# Patient Record
Sex: MALE | Race: OTHER | Employment: UNEMPLOYED | URBAN - METROPOLITAN AREA
[De-identification: names, ages, dates, MRNs, and addresses within clinical notes are randomized per-mention and may not be internally consistent; named-entity substitution may affect disease eponyms.]

---

## 2018-06-11 ENCOUNTER — HOSPITAL ENCOUNTER (OUTPATIENT)
Dept: GENERAL RADIOLOGY | Age: 43
Discharge: HOME | End: 2018-06-11
Attending: FAMILY MEDICINE
Payer: MEDICAID

## 2018-06-11 ENCOUNTER — LAB ENCOUNTER (OUTPATIENT)
Dept: LAB | Age: 43
End: 2018-06-11
Attending: FAMILY MEDICINE
Payer: MEDICAID

## 2018-06-11 ENCOUNTER — OFFICE VISIT (OUTPATIENT)
Dept: FAMILY MEDICINE CLINIC | Facility: CLINIC | Age: 43
End: 2018-06-11

## 2018-06-11 VITALS
SYSTOLIC BLOOD PRESSURE: 121 MMHG | WEIGHT: 183 LBS | TEMPERATURE: 98 F | DIASTOLIC BLOOD PRESSURE: 72 MMHG | HEIGHT: 72 IN | HEART RATE: 63 BPM | BODY MASS INDEX: 24.79 KG/M2

## 2018-06-11 DIAGNOSIS — F10.20 ALCOHOLISM (HCC): ICD-10-CM

## 2018-06-11 DIAGNOSIS — K92.1 BLOOD IN STOOL: ICD-10-CM

## 2018-06-11 DIAGNOSIS — R10.84 GENERALIZED ABDOMINAL PAIN: ICD-10-CM

## 2018-06-11 DIAGNOSIS — R10.84 GENERALIZED ABDOMINAL PAIN: Primary | ICD-10-CM

## 2018-06-11 DIAGNOSIS — F12.90 MARIJUANA USE: ICD-10-CM

## 2018-06-11 DIAGNOSIS — F17.200 SMOKER: ICD-10-CM

## 2018-06-11 PROCEDURE — 81001 URINALYSIS AUTO W/SCOPE: CPT

## 2018-06-11 PROCEDURE — 36415 COLL VENOUS BLD VENIPUNCTURE: CPT

## 2018-06-11 PROCEDURE — 80053 COMPREHEN METABOLIC PANEL: CPT

## 2018-06-11 PROCEDURE — 83690 ASSAY OF LIPASE: CPT

## 2018-06-11 PROCEDURE — 85025 COMPLETE CBC W/AUTO DIFF WBC: CPT

## 2018-06-11 PROCEDURE — 99212 OFFICE O/P EST SF 10 MIN: CPT | Performed by: FAMILY MEDICINE

## 2018-06-11 PROCEDURE — 99214 OFFICE O/P EST MOD 30 MIN: CPT | Performed by: FAMILY MEDICINE

## 2018-06-11 PROCEDURE — 74018 RADEX ABDOMEN 1 VIEW: CPT | Performed by: FAMILY MEDICINE

## 2018-06-11 NOTE — PROGRESS NOTES
HPI:    Patient ID: Richard Kwok is a 37year old male. HPI     Patient is new to the clinic with complaints of abdominal pain which appear to be more generalized for the last 1 week. Patient states pain is on and off.   Denies any changes in bowels b COMP METABOLIC PANEL (14); Future  - LIPASE; Future  - URINALYSIS, ROUTINE; Future  - XR ABDOMEN (1 VIEW) (CPT=74018); Future  - GASTRO - INTERNAL    2. Alcoholism ([facility] [address]  Advised cutting back    3.  Blood in stool  If worsening, go to ER  Avoid straining  -

## 2018-06-15 ENCOUNTER — TELEPHONE (OUTPATIENT)
Dept: OTHER | Age: 43
End: 2018-06-15

## 2018-06-15 NOTE — TELEPHONE ENCOUNTER
----- Message from Walt Terrazas MD sent at 6/12/2018 12:42 PM CDT -----  Labs overall ok  If pain persisting, would recommend a CT scan  pls corbin patient

## 2018-06-15 NOTE — TELEPHONE ENCOUNTER
lmtcb please provide both messages.         Notes recorded by Jose Osuna MD on 6/12/2018 at 12:43 PM CDT  X-ray appears to be okay other than calcifications in the prostate gland and some calcification noted in the left side of the pelvis of unknown etio

## 2018-06-20 NOTE — TELEPHONE ENCOUNTER
No call back from pt, again no answer and lmtcb to provice both messages from md below. Dr Luis Alfredo Holcomb would you like a no phone response letter mailed to pts' home address on file?

## 2018-06-25 ENCOUNTER — OFFICE VISIT (OUTPATIENT)
Dept: FAMILY MEDICINE CLINIC | Facility: CLINIC | Age: 43
End: 2018-06-25

## 2018-06-25 VITALS
TEMPERATURE: 98 F | WEIGHT: 183 LBS | HEIGHT: 72 IN | BODY MASS INDEX: 24.79 KG/M2 | SYSTOLIC BLOOD PRESSURE: 108 MMHG | DIASTOLIC BLOOD PRESSURE: 66 MMHG | HEART RATE: 62 BPM

## 2018-06-25 DIAGNOSIS — Z00.00 WELL ADULT EXAM: Primary | ICD-10-CM

## 2018-06-25 DIAGNOSIS — F10.20 ALCOHOLISM (HCC): ICD-10-CM

## 2018-06-25 DIAGNOSIS — K92.1 BLOOD IN STOOL: ICD-10-CM

## 2018-06-25 DIAGNOSIS — F17.200 SMOKER: ICD-10-CM

## 2018-06-25 DIAGNOSIS — F12.90 MARIJUANA USE: ICD-10-CM

## 2018-06-25 PROCEDURE — 99396 PREV VISIT EST AGE 40-64: CPT | Performed by: FAMILY MEDICINE

## 2018-06-25 NOTE — PROGRESS NOTES
Lesly Harris is a 37year old male who presents for a complete physical exam.   HPI:   Pt complains of Patient presents with:   Well Adult      Wt Readings from Last 6 Encounters:  06/25/18 : 183 lb (83 kg)  06/11/18 : 183 lb (83 kg)        Current Outpat asthma    EXAM:   /66   Pulse 62   Temp 98 °F (36.7 °C) (Oral)   Ht 6' (1.829 m)   Wt 183 lb (83 kg)   BMI 24.82 kg/m²   Body mass index is 24.82 kg/m².    GENERAL: well developed, well nourished,in no apparent distress  SKIN: no rashes,no suspicious

## (undated) NOTE — LETTER
6/20/2018              [address]         Dear Geovany Marks,    This letter is to inform you that our office has made several attempts to reach you by phone without success.   We were attempting to contact y